# Patient Record
Sex: FEMALE | Race: WHITE
[De-identification: names, ages, dates, MRNs, and addresses within clinical notes are randomized per-mention and may not be internally consistent; named-entity substitution may affect disease eponyms.]

---

## 2017-03-01 ENCOUNTER — HOSPITAL ENCOUNTER (EMERGENCY)
Dept: HOSPITAL 47 - EC | Age: 57
Discharge: HOME | End: 2017-03-01
Payer: COMMERCIAL

## 2017-03-01 VITALS
TEMPERATURE: 97.8 F | RESPIRATION RATE: 16 BRPM | HEART RATE: 78 BPM | SYSTOLIC BLOOD PRESSURE: 153 MMHG | DIASTOLIC BLOOD PRESSURE: 78 MMHG

## 2017-03-01 DIAGNOSIS — Z88.2: ICD-10-CM

## 2017-03-01 DIAGNOSIS — Z79.899: ICD-10-CM

## 2017-03-01 DIAGNOSIS — R19.7: ICD-10-CM

## 2017-03-01 DIAGNOSIS — F17.200: ICD-10-CM

## 2017-03-01 DIAGNOSIS — J32.9: Primary | ICD-10-CM

## 2017-03-01 DIAGNOSIS — R10.9: ICD-10-CM

## 2017-03-01 LAB
ALP SERPL-CCNC: 91 U/L (ref 38–126)
ALT SERPL-CCNC: 22 U/L (ref 9–52)
ANION GAP SERPL CALC-SCNC: 13 MMOL/L
APTT BLD: 24.5 SEC (ref 22–30)
AST SERPL-CCNC: 21 U/L (ref 14–36)
BASOPHILS # BLD AUTO: 0.1 K/UL (ref 0–0.2)
BASOPHILS NFR BLD AUTO: 1 %
BUN SERPL-SCNC: 14 MG/DL (ref 7–17)
CALCIUM SPEC-MCNC: 10.2 MG/DL (ref 8.4–10.2)
CH: 31.7
CHCM: 33.4
CHLORIDE SERPL-SCNC: 104 MMOL/L (ref 98–107)
CK SERPL-CCNC: 55 U/L (ref 30–135)
CO2 SERPL-SCNC: 26 MMOL/L (ref 22–30)
EOSINOPHIL # BLD AUTO: 0.3 K/UL (ref 0–0.7)
EOSINOPHIL NFR BLD AUTO: 3 %
ERYTHROCYTE [DISTWIDTH] IN BLOOD BY AUTOMATED COUNT: 4.93 M/UL (ref 3.8–5.4)
ERYTHROCYTE [DISTWIDTH] IN BLOOD: 12.7 % (ref 11.5–15.5)
GLUCOSE SERPL-MCNC: 100 MG/DL (ref 74–99)
HCT VFR BLD AUTO: 47 % (ref 34–46)
HDW: 2.38
HGB BLD-MCNC: 15.5 GM/DL (ref 11.4–16)
INR PPP: 1.1 (ref ?–1.1)
LUC NFR BLD AUTO: 3 %
LYMPHOCYTES # SPEC AUTO: 2.2 K/UL (ref 1–4.8)
LYMPHOCYTES NFR SPEC AUTO: 28 %
MAGNESIUM SPEC-SCNC: 2.1 MG/DL (ref 1.6–2.3)
MCH RBC QN AUTO: 31.5 PG (ref 25–35)
MCHC RBC AUTO-ENTMCNC: 33.1 G/DL (ref 31–37)
MCV RBC AUTO: 95.3 FL (ref 80–100)
MONOCYTES # BLD AUTO: 0.4 K/UL (ref 0–1)
MONOCYTES NFR BLD AUTO: 4 %
NEUTROPHILS # BLD AUTO: 4.9 K/UL (ref 1.3–7.7)
NEUTROPHILS NFR BLD AUTO: 62 %
NON-AFRICAN AMERICAN GFR(MDRD): >60
PH UR: 6 [PH] (ref 5–8)
POTASSIUM SERPL-SCNC: 4.2 MMOL/L (ref 3.5–5.1)
PROT SERPL-MCNC: 8.5 G/DL (ref 6.3–8.2)
PT BLD: 10.9 SEC (ref 9–12)
SODIUM SERPL-SCNC: 143 MMOL/L (ref 137–145)
SP GR UR: 1 (ref 1–1.03)
TROPONIN I SERPL-MCNC: <0.012 NG/ML (ref 0–0.03)
UA BILLING (MACRO VS. MICRO): (no result)
UROBILINOGEN UR QL STRIP: <2 MG/DL (ref ?–2)
WBC # BLD AUTO: 0.2 10*3/UL
WBC # BLD AUTO: 8 K/UL (ref 3.8–10.6)
WBC (PEROX): 7.92

## 2017-03-01 PROCEDURE — 84484 ASSAY OF TROPONIN QUANT: CPT

## 2017-03-01 PROCEDURE — 82550 ASSAY OF CK (CPK): CPT

## 2017-03-01 PROCEDURE — 82553 CREATINE MB FRACTION: CPT

## 2017-03-01 PROCEDURE — 85610 PROTHROMBIN TIME: CPT

## 2017-03-01 PROCEDURE — 85730 THROMBOPLASTIN TIME PARTIAL: CPT

## 2017-03-01 PROCEDURE — 71020: CPT

## 2017-03-01 PROCEDURE — 93005 ELECTROCARDIOGRAM TRACING: CPT

## 2017-03-01 PROCEDURE — 99284 EMERGENCY DEPT VISIT MOD MDM: CPT

## 2017-03-01 PROCEDURE — 36415 COLL VENOUS BLD VENIPUNCTURE: CPT

## 2017-03-01 PROCEDURE — 81003 URINALYSIS AUTO W/O SCOPE: CPT

## 2017-03-01 PROCEDURE — 83735 ASSAY OF MAGNESIUM: CPT

## 2017-03-01 PROCEDURE — 80053 COMPREHEN METABOLIC PANEL: CPT

## 2017-03-01 PROCEDURE — 85025 COMPLETE CBC W/AUTO DIFF WBC: CPT

## 2017-03-01 PROCEDURE — 96374 THER/PROPH/DIAG INJ IV PUSH: CPT

## 2017-03-01 PROCEDURE — 96375 TX/PRO/DX INJ NEW DRUG ADDON: CPT

## 2017-03-01 NOTE — XR
EXAMINATION TYPE: XR chest 2V

 

DATE OF EXAM: 3/1/2017 4:07 PM

 

COMPARISON: 6/9/2012

 

HISTORY: Shortness of breath

 

TECHNIQUE:  Frontal and lateral views of the chest are obtained.

 

FINDINGS:

 

Scattered senescent parenchymal changes noted. Hyperinflation compatible with COPD. 

 

No evidence for infiltrate. No evidence for atelectasis.

 

Heart size is stable.

 

Mediastinal structures are stable and grossly unremarkable.

 

No evidence for hilar prominence.

 

Degenerative changes dorsal spine. 

 

IMPRESSION:

1. No evidence for acute pulmonary disease.
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
## 2017-03-24 ENCOUNTER — HOSPITAL ENCOUNTER (OUTPATIENT)
Dept: HOSPITAL 47 - EC | Age: 57
Setting detail: OBSERVATION
LOS: 2 days | Discharge: HOME | End: 2017-03-26
Payer: COMMERCIAL

## 2017-03-24 VITALS — BODY MASS INDEX: 21.5 KG/M2

## 2017-03-24 VITALS — RESPIRATION RATE: 18 BRPM

## 2017-03-24 DIAGNOSIS — R53.1: ICD-10-CM

## 2017-03-24 DIAGNOSIS — R35.0: ICD-10-CM

## 2017-03-24 DIAGNOSIS — Z88.2: ICD-10-CM

## 2017-03-24 DIAGNOSIS — K59.00: ICD-10-CM

## 2017-03-24 DIAGNOSIS — Z82.49: ICD-10-CM

## 2017-03-24 DIAGNOSIS — M19.90: ICD-10-CM

## 2017-03-24 DIAGNOSIS — F43.29: ICD-10-CM

## 2017-03-24 DIAGNOSIS — Z85.41: ICD-10-CM

## 2017-03-24 DIAGNOSIS — R09.81: ICD-10-CM

## 2017-03-24 DIAGNOSIS — R07.89: ICD-10-CM

## 2017-03-24 DIAGNOSIS — F41.0: ICD-10-CM

## 2017-03-24 DIAGNOSIS — R00.2: ICD-10-CM

## 2017-03-24 DIAGNOSIS — R63.4: ICD-10-CM

## 2017-03-24 DIAGNOSIS — I10: Primary | ICD-10-CM

## 2017-03-24 DIAGNOSIS — F17.200: ICD-10-CM

## 2017-03-24 DIAGNOSIS — R19.7: ICD-10-CM

## 2017-03-24 DIAGNOSIS — Z79.899: ICD-10-CM

## 2017-03-24 DIAGNOSIS — F32.9: ICD-10-CM

## 2017-03-24 LAB
ALP SERPL-CCNC: 80 U/L (ref 38–126)
ALT SERPL-CCNC: 21 U/L (ref 9–52)
ANION GAP SERPL CALC-SCNC: 10 MMOL/L
AST SERPL-CCNC: 19 U/L (ref 14–36)
BASOPHILS # BLD AUTO: 0 K/UL (ref 0–0.2)
BASOPHILS NFR BLD AUTO: 1 %
BUN SERPL-SCNC: 13 MG/DL (ref 7–17)
CALCIUM SPEC-MCNC: 9.9 MG/DL (ref 8.4–10.2)
CH: 31.3
CHCM: 33.1
CHLORIDE SERPL-SCNC: 105 MMOL/L (ref 98–107)
CO2 SERPL-SCNC: 27 MMOL/L (ref 22–30)
EOSINOPHIL # BLD AUTO: 0.2 K/UL (ref 0–0.7)
EOSINOPHIL NFR BLD AUTO: 3 %
ERYTHROCYTE [DISTWIDTH] IN BLOOD BY AUTOMATED COUNT: 5.08 M/UL (ref 3.8–5.4)
ERYTHROCYTE [DISTWIDTH] IN BLOOD: 12.2 % (ref 11.5–15.5)
GLUCOSE SERPL-MCNC: 88 MG/DL (ref 74–99)
HCT VFR BLD AUTO: 48.4 % (ref 34–46)
HDW: 2.28
HGB BLD-MCNC: 15.9 GM/DL (ref 11.4–16)
LUC NFR BLD AUTO: 3 %
LYMPHOCYTES # SPEC AUTO: 1.6 K/UL (ref 1–4.8)
LYMPHOCYTES NFR SPEC AUTO: 25 %
MCH RBC QN AUTO: 31.2 PG (ref 25–35)
MCHC RBC AUTO-ENTMCNC: 32.8 G/DL (ref 31–37)
MCV RBC AUTO: 95.2 FL (ref 80–100)
MONOCYTES # BLD AUTO: 0.4 K/UL (ref 0–1)
MONOCYTES NFR BLD AUTO: 6 %
NEUTROPHILS # BLD AUTO: 3.9 K/UL (ref 1.3–7.7)
NEUTROPHILS NFR BLD AUTO: 62 %
NON-AFRICAN AMERICAN GFR(MDRD): >60
PH UR: 6 [PH] (ref 5–8)
POTASSIUM SERPL-SCNC: 5.1 MMOL/L (ref 3.5–5.1)
PROT SERPL-MCNC: 7.4 G/DL (ref 6.3–8.2)
SODIUM SERPL-SCNC: 142 MMOL/L (ref 137–145)
SP GR UR: 1 (ref 1–1.03)
UA BILLING (MACRO VS. MICRO): (no result)
UROBILINOGEN UR QL STRIP: <2 MG/DL (ref ?–2)
WBC # BLD AUTO: 0.18 10*3/UL
WBC # BLD AUTO: 6.3 K/UL (ref 3.8–10.6)
WBC (PEROX): 6.11

## 2017-03-24 PROCEDURE — 81003 URINALYSIS AUTO W/O SCOPE: CPT

## 2017-03-24 PROCEDURE — 36415 COLL VENOUS BLD VENIPUNCTURE: CPT

## 2017-03-24 PROCEDURE — 93306 TTE W/DOPPLER COMPLETE: CPT

## 2017-03-24 PROCEDURE — 80306 DRUG TEST PRSMV INSTRMNT: CPT

## 2017-03-24 PROCEDURE — 96361 HYDRATE IV INFUSION ADD-ON: CPT

## 2017-03-24 PROCEDURE — 84484 ASSAY OF TROPONIN QUANT: CPT

## 2017-03-24 PROCEDURE — 99284 EMERGENCY DEPT VISIT MOD MDM: CPT

## 2017-03-24 PROCEDURE — 84443 ASSAY THYROID STIM HORMONE: CPT

## 2017-03-24 PROCEDURE — 83880 ASSAY OF NATRIURETIC PEPTIDE: CPT

## 2017-03-24 PROCEDURE — 87502 INFLUENZA DNA AMP PROBE: CPT

## 2017-03-24 PROCEDURE — 80061 LIPID PANEL: CPT

## 2017-03-24 PROCEDURE — 80053 COMPREHEN METABOLIC PANEL: CPT

## 2017-03-24 PROCEDURE — 83690 ASSAY OF LIPASE: CPT

## 2017-03-24 PROCEDURE — 80048 BASIC METABOLIC PNL TOTAL CA: CPT

## 2017-03-24 PROCEDURE — 93005 ELECTROCARDIOGRAM TRACING: CPT

## 2017-03-24 PROCEDURE — 96376 TX/PRO/DX INJ SAME DRUG ADON: CPT

## 2017-03-24 PROCEDURE — 85025 COMPLETE CBC W/AUTO DIFF WBC: CPT

## 2017-03-24 PROCEDURE — 71020: CPT

## 2017-03-24 PROCEDURE — 96372 THER/PROPH/DIAG INJ SC/IM: CPT

## 2017-03-24 PROCEDURE — 96374 THER/PROPH/DIAG INJ IV PUSH: CPT

## 2017-03-24 RX ADMIN — KETOROLAC TROMETHAMINE PRN MG: 30 INJECTION, SOLUTION INTRAMUSCULAR at 14:28

## 2017-03-24 RX ADMIN — METOPROLOL TARTRATE SCH MG: 25 TABLET, FILM COATED ORAL at 17:18

## 2017-03-24 RX ADMIN — HEPARIN SODIUM SCH UNIT: 5000 INJECTION, SOLUTION INTRAVENOUS; SUBCUTANEOUS at 22:03

## 2017-03-24 RX ADMIN — METOPROLOL TARTRATE SCH MG: 25 TABLET, FILM COATED ORAL at 23:41

## 2017-03-24 RX ADMIN — NICOTINE SCH: 14 PATCH, EXTENDED RELEASE TRANSDERMAL at 17:24

## 2017-03-24 RX ADMIN — KETOROLAC TROMETHAMINE PRN MG: 30 INJECTION, SOLUTION INTRAMUSCULAR at 23:40
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
## 2017-03-24 NOTE — XR
EXAMINATION TYPE: XR chest 2V

 

DATE OF EXAM: 3/24/2017 11:45 AM

 

COMPARISON: Chest x-ray March 1, 2017.

 

HISTORY: Hypertension with chest pain.

 

TECHNIQUE:  Frontal and lateral views of the chest are obtained.

 

FINDINGS:  There is no focal air space opacity, pleural effusion, or pneumothorax seen.  The cardiac 
silhouette size is within normal limits.   The osseous structures are intact.

 

IMPRESSION:  No acute process. No significant change from prior.

## 2017-03-24 NOTE — HP
DATE OF ADMISSION:  03/24/2017



CHIEF COMPLAINT: Palpitations, hypertension. 



HISTORY OF PRESENT ILLNESS: This 56-year-old woman with a past medical 

history of multiple medical problems, including hypertension, history 

of cervical cancer, history of DJD, anxiety, depression, being 

followed by Dr. Holloway in the outpatient setting, was not feeling well 

over the past several days. The patient is complaining of tiredness, 

weakness and palpitations. The patient felt chest fullness, also. The 

patient came to [facility] and was admitted for further 

evaluation. Patient was also found to have elevated blood pressure; 

systolic was 172/77. There is no history of any fever, rigor, or 

chills at this time.  



PAST MEDICAL HISTORY: 

1. History of hypertension. 

2. History of cervical cancer. 

3. History of DJD.

4. Anxiety. 

5. Depression. 



Medications prior to admission include:

1. Prozac 10 mg daily. 

2. Allegra 180 mg daily. 



ALLERGIES: 

1. BACTRIM. 

2. SULFA. 



FAMILY HISTORY: History of hypertension in father. History of cardiac 

stent in mother.  



SOCIAL HISTORY: Occasional alcohol intake. History of smoking. 



REVIEW OF SYSTEMS: 

ENT: No diminished vision. No diminished hearing. 

CARDIOVASCULAR: As mentioned earlier. 

RESPIRATORY: As mentioned earlier. 

GI: No nausea. 

: No dysuria. 

NERVOUS SYSTEM: No numbness, weakness. 

ALLERGY/IMMUNOLOGY: No asthma, hayfever. 

MUSCULOSKELETAL: As mentioned earlier. 

HEMATOLOGY/ONCOLOGY: No history of anemia. Otherwise as mentioned 

earlier. 

ENDOCRINE: No history of diabetes, hypothyroidism.

CONSTITUTIONAL:  As mentioned earlier. 

MUSCULOSKELETAL: Negative. 

ENDOCRINE: No diabetes mellitus. 

PSYCHIATRY: As mentioned earlier. 



PHYSICAL EXAMINATION: Patient alert and oriented x3. Pulse is 60, 

blood pressure 164/76, respiration 18, temperature 98.7, pulse ox 100% 

on room air.  

HEENT: Conjunctivae normal. Oral mucosa moist. 

NECK: No jugular venous distention. No carotid bruit. No lymph node 

enlargement. 

CARDIOVASCULAR SYSTEM: S1, S2 muffled. No S3. No S4.

RESPIRATORY SYSTEM: Breath sounds diminished at the bases. A few 

rhonchi. No crackles.  

ABDOMEN: Soft, non-tender. No mass palpable. 

LEGS: No edema. No swelling. 

NERVOUS SYSTEM: Higher functions as mentioned earlier. Moves all 4 

limbs. No focal motor or sensory deficit.  

LYMPHATICS:  No lymph node palpable in neck, axillae or groin.   

SKIN: No ulcer, rash, bleeding. 



LABS: CBC, BMP within normal limits. UA is negative. 



ASSESSMENT: 

1. Hypertension.

2. Palpitations and chest discomfort for evaluation. 

3. Anxiety, depression not otherwise specified. 

4. History of degenerative joint disease. 

5. History of hypertension. 

6. History of cervical cancer. 

7. History of nicotine dependence. 



RECOMMENDATIONS AND DISCUSSION: In this 56-year-old woman who 

presented with multiple complex medical issues, at this time I 

recommend to continue current medications, continue symptomatic 

treatment. I recommend beta blockers. Closely follow with Cardiology. 

Otherwise, guarded prognosis because of multiple complex medical 

issues. Further recommendations to follow. I would also recommend a 

psychiatric consult. I would also recommend the patient to follow up 

closely with Dr. Holloway in the outpatient setting. Medication 

reconciliation was done. Please see orders for further details.

## 2017-03-25 LAB
ANION GAP SERPL CALC-SCNC: 7 MMOL/L
BASOPHILS # BLD AUTO: 0.1 K/UL (ref 0–0.2)
BASOPHILS NFR BLD AUTO: 1 %
BUN SERPL-SCNC: 11 MG/DL (ref 7–17)
CALCIUM SPEC-MCNC: 9.3 MG/DL (ref 8.4–10.2)
CH: 31.2
CHCM: 32.9
CHLORIDE SERPL-SCNC: 108 MMOL/L (ref 98–107)
CHOLEST SERPL-MCNC: 157 MG/DL (ref ?–200)
CO2 SERPL-SCNC: 25 MMOL/L (ref 22–30)
EOSINOPHIL # BLD AUTO: 0.4 K/UL (ref 0–0.7)
EOSINOPHIL NFR BLD AUTO: 7 %
ERYTHROCYTE [DISTWIDTH] IN BLOOD BY AUTOMATED COUNT: 4.46 M/UL (ref 3.8–5.4)
ERYTHROCYTE [DISTWIDTH] IN BLOOD: 12.3 % (ref 11.5–15.5)
GLUCOSE SERPL-MCNC: 80 MG/DL (ref 74–99)
HCT VFR BLD AUTO: 42.5 % (ref 34–46)
HDLC SERPL-MCNC: 62 MG/DL (ref 40–60)
HDW: 2.3
HGB BLD-MCNC: 13.9 GM/DL (ref 11.4–16)
LUC NFR BLD AUTO: 4 %
LYMPHOCYTES # SPEC AUTO: 2.8 K/UL (ref 1–4.8)
LYMPHOCYTES NFR SPEC AUTO: 48 %
MCH RBC QN AUTO: 31.2 PG (ref 25–35)
MCHC RBC AUTO-ENTMCNC: 32.7 G/DL (ref 31–37)
MCV RBC AUTO: 95.2 FL (ref 80–100)
MONOCYTES # BLD AUTO: 0.3 K/UL (ref 0–1)
MONOCYTES NFR BLD AUTO: 5 %
NEUTROPHILS # BLD AUTO: 2 K/UL (ref 1.3–7.7)
NEUTROPHILS NFR BLD AUTO: 35 %
NON-AFRICAN AMERICAN GFR(MDRD): >60
POTASSIUM SERPL-SCNC: 4.9 MMOL/L (ref 3.5–5.1)
SODIUM SERPL-SCNC: 140 MMOL/L (ref 137–145)
TRIGL SERPL-MCNC: 79 MG/DL (ref ?–150)
WBC # BLD AUTO: 0.22 10*3/UL
WBC # BLD AUTO: 5.8 K/UL (ref 3.8–10.6)
WBC (PEROX): 5.83

## 2017-03-25 RX ADMIN — METOPROLOL TARTRATE SCH MG: 25 TABLET, FILM COATED ORAL at 21:48

## 2017-03-25 RX ADMIN — METOPROLOL TARTRATE SCH MG: 25 TABLET, FILM COATED ORAL at 08:27

## 2017-03-25 RX ADMIN — HEPARIN SODIUM SCH UNIT: 5000 INJECTION, SOLUTION INTRAVENOUS; SUBCUTANEOUS at 08:27

## 2017-03-25 RX ADMIN — KETOROLAC TROMETHAMINE PRN MG: 30 INJECTION, SOLUTION INTRAMUSCULAR at 22:54

## 2017-03-25 RX ADMIN — NICOTINE SCH: 14 PATCH, EXTENDED RELEASE TRANSDERMAL at 08:26

## 2017-03-25 RX ADMIN — HEPARIN SODIUM SCH UNIT: 5000 INJECTION, SOLUTION INTRAVENOUS; SUBCUTANEOUS at 21:48

## 2017-03-25 RX ADMIN — LORATADINE SCH MG: 10 TABLET ORAL at 08:27
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
## 2017-03-26 VITALS — DIASTOLIC BLOOD PRESSURE: 61 MMHG | TEMPERATURE: 97.1 F | SYSTOLIC BLOOD PRESSURE: 117 MMHG | HEART RATE: 55 BPM

## 2017-03-26 RX ADMIN — NICOTINE SCH: 14 PATCH, EXTENDED RELEASE TRANSDERMAL at 07:49

## 2017-03-26 RX ADMIN — HEPARIN SODIUM SCH UNIT: 5000 INJECTION, SOLUTION INTRAVENOUS; SUBCUTANEOUS at 07:50

## 2017-03-26 RX ADMIN — METOPROLOL TARTRATE SCH MG: 25 TABLET, FILM COATED ORAL at 07:50

## 2017-03-26 RX ADMIN — LORATADINE SCH MG: 10 TABLET ORAL at 07:50

## 2017-03-26 NOTE — DS
DATE OF ADMISSION:   03/24/2017

DATE OF DISCHARGE:   03/26/2017



FINAL DIAGNOSES:

1. Hypertension. 

2. Palpations chest, myocardial infarction ruled out, rule out 

coronary artery disease.  

3. Anxiety, depression, not otherwise specified. 

4. History of degenerative joint disease .

5. History of hypertension. 

6. History of cervical cancer.

7. History of nicotine dependence. 



DISCHARGE DISPOSITION: The patient will be discharged in a stable 

discharge with guarded prognosis.  Discharge cleared by multiple 

consultants.    



HISTORY OF PRESENT ILLNESS: This 56-year-old woman with a past medical 

history of multiple medical problems, admitted with hypertension, 

palpitations. The patient was treated symptomatically, myocardial 

infarction ruled out, cardiology recommended outpatient follow-up and 

Dr. Olivo from psych also saw the patient.   



On exam, vital signs stable.  CARDIOVASCULAR: S1, S2.  CENTRAL NERVOUS 

SYSTEM: No focal deficits. ABDOMEN: Soft.   



DISCHARGE MEDICATIONS AND DISCHARGE ADVICE: 

1. Diet is cardiac. 

2. Activity limited until follow-up. 

3. Follow up with Dr. Holloway in 2 to 3 days.

4. Follow-up with Dr. Ramos as advised in the outpatient setting. 

5. Medications Xanax 0.25 t.i.d. p.r.n. 

6. Tylenol 500 mg q.6 p.r.n. for pain. 

7. Lexapro 10 mg p.o. daily. 

8. Fexofenadine 180 mg p.o. daily. 

9. Lopressor 12.5 mg p.o. b.i.d. 

10. Habitrol 14 daily.



Once again, the patient will be discharged in stable condition with 

guarded prognosis.

## 2017-03-26 NOTE — PN
DATE OF SERVICE: 3/25/2017 



This 56-year-old woman who was admitted with palpitations and 

hypertension also had some significant depression also. Cardiology is 

following the patient closely.  Medication adjusted.  2D echo has been 

ordered. Patient is on low dose beta blockers. 



On exam, alert and oriented times three.  Pulse 61.  Blood pressure 

130/78, respiratory  rate 18, temperature 97.0, pulse ox 98% on room 

air. 

HEENT: Conjunctivae normal. 

NECK: No jugular venous distention.

CARDIOVASCULAR: S1, S2 muffled. 

RESPIRATORY: Breath sounds diminished at the bases. No rhonchi, no 

crackles.  

ABDOMEN: Soft. Nontender. 

LEGS: No edema. No swelling. 



Labs are CBC within normal limits, CMP with  62. 



ASSESSMENT:

1. Hypertension. 

2. Palpitations and chest discomfort for evaluation rule out coronary 

artery disease.  

3. Anxiety, depression, not otherwise specified. 

4. History of degenerative joint disease. 

5. Hypertension. 

6. History of cervical cancer. 

7. History of nicotine dependence.  



RECOMMENDATIONS AND DISCUSSION: At this time, recommend to continue 

current medications. Continue to monitor. Symptomatic treatment.  

Otherwise, closely follow up. Continue the beta blockers.  Closely 

follow with cardiology and psychiatric evaluation appreciated.  



DOROTHY

## 2017-03-26 NOTE — P.PN
Subjective


Principal diagnosis: 





Palpitations





This is a pleasant 56-year-old female patient with past medical history of pre-

eclampsia during her first pregnancy, anxiety and depression for which she was 

recently started on fluoxetine, cervical cancer and recent diagnosis of 

hypertension for which she has not been prescribed any antihypertensive agents.

  Was seen in the emergency department earlier this month and noted to be 

hypertensive.  He has not been on any medication for hypertension.  Presents 

for complaints of high blood pressure, palpitations, weakness, fatigue, change 

in bowel movements with alternating constipation and diarrhea.  Blood pressure 

upon presentation to the emergency room 160s to 190 systolic.  EKG on admission 

showed normal sinus rhythm.  Patient continues to be in normal sinus rhythm.  

Blood pressure today is stable.  TSH level is normal.  Arrangements are being 

made for possible discharge home, echocardiogram with Doppler study remains 

pending.  When the patient is discharged home we will recommend an event 

monitor as an outpatient.





Objective





- Vital Signs


Vital signs: 


 Vital Signs











Temp  97.1 F L  03/26/17 11:00


 


Pulse  55 L  03/26/17 11:00


 


Resp  18   03/26/17 11:00


 


BP  117/61   03/26/17 11:00


 


Pulse Ox  98   03/26/17 11:00








 Intake & Output











 03/25/17 03/26/17 03/26/17





 18:59 06:59 18:59


 


Intake Total 480 800 922


 


Balance 480 800 922


 


Weight 53.4 kg 51.5 kg 


 


Intake:   


 


  Oral 480 800 922


 


Other:   


 


  Voiding Method Toilet Toilet Toilet


 


  # Voids  1 














- Exam





PHYSICAL EXAMINATION: 





HEENT: Head is atraumatic, normocephalic.  Pupils equal, round.  Neck is 

supple.  There is no elevated jugular venous pressure.





HEART EXAMINATION: Heart S1, S2 normal.  No murmur or gallop heard.





CHEST EXAMINATION: Lungs are clear to auscultation and precussion. No chest 

wall tenderness is noted on palpation or with deep breathing.





ABDOMEN:  Soft, nontender. Bowel sounds are heard. No organomegaly noted.


 


EXTREMITIES: 2+ peripheral pulses with no evidence of peripheral edema and no 

calf tenderness noted.





NEUROLOGIC patient is awake, alert and oriented -3.


 


.


 








- Labs


CBC & Chem 7: 


 03/25/17 06:36





 03/25/17 06:34





Assessment and Plan


Plan: 





Assessment and Plan


Plan: 


Assessment and plan





#1 hypertension


#2 palpitations


#3 recent change in bowel movements with alternating constipation and diarrhea 

and 5 pound weight loss, PCP is scheduling patient for a colonoscopy


#4 anxiety and depression, recently started on fluoxetine


#5 history of cervical cancer





Plan


Cardiology's perspective, patient may be able to be discharged once cleared by 

the primary.  We will recommend an event monitor on discharge.  A follow-up 

appointment will also be made in the office post discharge.








DNP note has been reviewed, I agree with a documented findings and plan of 

care.  Patient was seen and examined.

## 2017-03-26 NOTE — P.PN
Progress Note - Text





Interval history: Patient was seen for psychiatric follow-up ,was laying in her 

bed .  She states that she is less anxious ,hoping to be discharged soon ,

discussed healthy coping skills to deal with ongoing stressor





Mental status exam: She is alert and cooperative with the interview.  Her 

affect is appropriate ,smiling at times   Her speech is spontaneous and 

coherent  Her mood is BETTER""She denies any thoughts of harm to self or 

others.  She denies any hallucinations.  There is no evidence of any active 

psychosis or italia,insight and judgment good





Plan: Continue current medication ,was referred to her therapist at [facility]  ,please discharge when medically cleared

## 2017-03-27 ENCOUNTER — HOSPITAL ENCOUNTER (OUTPATIENT)
Dept: HOSPITAL 47 - RADECHMAIN | Age: 57
Discharge: HOME | End: 2017-03-27
Payer: COMMERCIAL

## 2017-03-27 DIAGNOSIS — Z12.31: Primary | ICD-10-CM

## 2017-03-27 PROCEDURE — 93226 XTRNL ECG REC<48 HR SCAN A/R: CPT

## 2017-03-27 PROCEDURE — 93225 XTRNL ECG REC<48 HRS REC: CPT

## 2017-03-27 NOTE — ECHOF
Referral Reason:palpitations



MEASUREMENTS

--------

HEIGHT: 157.5 cm

WEIGHT: 53.1 kg

BP: 123/60

RVIDd:   2.8 cm     (< 3.3)

IVSd:   1.1 cm     (0.6 - 1.1)

LVIDd:   3.7 cm     (3.9 - 5.3)

LVPWd:   1.1 cm     (0.6 - 1.1)

IVSs:   1.5 cm

LVIDs:   2.6 cm

LVPWs:   1.4 cm

LA Diam:   2.6 cm     (2.7 - 3.8)

LAESV Index (A-L):   21.31 ml/m

Ao Diam:   2.8 cm     (2.0 - 3.7)

AV Cusp:   1.8 cm     (1.5 - 2.6)

MV EXCURSION:   14.946 mm     (> 18.000)

MV EF SLOPE:   120 mm/s     (70 - 150)

EPSS:   0.4 cm

MV E Lucio:   0.99 m/s

MV DecT:   175 ms

MV A Lucio:   0.76 m/s

MV E/A Ratio:   1.31 







FINDINGS

--------

Sinus rhythm.

This was a technically good study.

The left ventricular size is normal.   Left ventricular 

wall thickness is normal.   Overall left ventricular 

systolic function is normal with, an EF between 55 - 60 

%.

The right ventricle is normal in size.

Normal LA  size by volume 22+/-6 ml/m2.

The right atrium is normal in size.

The aortic valve is trileaflet and appears structurally 

normal.

The mitral valve is normal.   Mild mitral regurgitation 

is present.

The tricuspid valve appears structurally normal.   No 

regurgitation noted

There is no pulmonic regurgitation present.

The aortic root size is normal.

Normal inferior vena cava with normal inspiratory 

collapse consistent with estimated right atrial 

pressure of  5 mmHg.

There is no pericardial effusion.



CONCLUSIONS

--------

1. Sinus rhythm.

2. Normal inferior vena cava with normal inspiratory 

collapse consistent with estimated right atrial 

pressure of  5 mmHg.

3. There is no pericardial effusion.

4. This was a technically good study.

5. Left ventricular wall thickness is normal.

6. Normal LA size by volume 22+/-6 ml/m2.

7. The aortic valve is trileaflet and appears structurally 

normal.

8. Mild mitral regurgitation is present.

9. The tricuspid valve appears structurally normal.

10. There is no pulmonic regurgitation present.

11. The aortic root size is normal.





SONOGRAPHER: Gini Sanchez RDCS

## 2017-03-28 NOTE — MM
Reason for exam: screening  (asymptomatic).

Last mammogram was performed 2 years and 9 months ago.



History:

Patient is postmenopausal.



Physical Findings:

A clinical breast exam by your physician is recommended on an annual basis and 

results should be correlated with mammographic findings.



MG Screening Mammo w CAD

Bilateral CC and MLO view(s) were taken.

Prior study comparison: July 7, 2014, bilateral MG screening mammo w CAD.

The breast tissue is heterogeneously dense. This may lower the sensitivity of 

mammography.

Finding: There are typically benign calcifications in both breasts. There is a 

chronic nodularity in the right breast.





ASSESSMENT: Benign, BI-RAD 2



RECOMMENDATION:

Routine screening mammogram of both breasts in 1 year.

## 2018-10-13 ENCOUNTER — HOSPITAL ENCOUNTER (EMERGENCY)
Dept: HOSPITAL 47 - EC | Age: 58
LOS: 1 days | Discharge: HOME | End: 2018-10-14
Payer: COMMERCIAL

## 2018-10-13 VITALS — TEMPERATURE: 97.7 F

## 2018-10-13 DIAGNOSIS — I10: Primary | ICD-10-CM

## 2018-10-13 DIAGNOSIS — R09.81: ICD-10-CM

## 2018-10-13 DIAGNOSIS — Z79.899: ICD-10-CM

## 2018-10-13 DIAGNOSIS — F17.200: ICD-10-CM

## 2018-10-13 DIAGNOSIS — J34.89: ICD-10-CM

## 2018-10-13 DIAGNOSIS — Z91.048: ICD-10-CM

## 2018-10-13 DIAGNOSIS — Z88.2: ICD-10-CM

## 2018-10-13 DIAGNOSIS — Z82.49: ICD-10-CM

## 2018-10-13 PROCEDURE — 81003 URINALYSIS AUTO W/O SCOPE: CPT

## 2018-10-13 PROCEDURE — 93005 ELECTROCARDIOGRAM TRACING: CPT

## 2018-10-13 PROCEDURE — 84484 ASSAY OF TROPONIN QUANT: CPT

## 2018-10-13 PROCEDURE — 71046 X-RAY EXAM CHEST 2 VIEWS: CPT

## 2018-10-13 PROCEDURE — 36415 COLL VENOUS BLD VENIPUNCTURE: CPT

## 2018-10-13 PROCEDURE — 85025 COMPLETE CBC W/AUTO DIFF WBC: CPT

## 2018-10-13 PROCEDURE — 99283 EMERGENCY DEPT VISIT LOW MDM: CPT

## 2018-10-13 PROCEDURE — 80053 COMPREHEN METABOLIC PANEL: CPT

## 2018-10-13 NOTE — XR
EXAMINATION TYPE: XR chest 2V

 

DATE OF EXAM: 10/13/2018

 

COMPARISON: 3/24/2017

 

HISTORY: Hypertension and headache

 

TECHNIQUE:  Frontal and lateral views of the chest are obtained.

 

FINDINGS:  Heart and mediastinum are normal. Lungs are clear. Diaphragm is normal. Bony thorax appear
s normal.

 

IMPRESSION:  Normal chest. No change.
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
## 2018-10-14 VITALS — DIASTOLIC BLOOD PRESSURE: 82 MMHG | HEART RATE: 70 BPM | SYSTOLIC BLOOD PRESSURE: 160 MMHG

## 2018-10-14 VITALS — RESPIRATION RATE: 16 BRPM

## 2018-10-14 LAB
ALBUMIN SERPL-MCNC: 3.9 G/DL (ref 3.5–5)
ALP SERPL-CCNC: 74 U/L (ref 38–126)
ALT SERPL-CCNC: 21 U/L (ref 9–52)
ANION GAP SERPL CALC-SCNC: 10 MMOL/L
AST SERPL-CCNC: 19 U/L (ref 14–36)
BASOPHILS # BLD AUTO: 0.1 K/UL (ref 0–0.2)
BASOPHILS NFR BLD AUTO: 1 %
BUN SERPL-SCNC: 14 MG/DL (ref 7–17)
CALCIUM SPEC-MCNC: 9.4 MG/DL (ref 8.4–10.2)
CHLORIDE SERPL-SCNC: 108 MMOL/L (ref 98–107)
CO2 SERPL-SCNC: 23 MMOL/L (ref 22–30)
EOSINOPHIL # BLD AUTO: 0.4 K/UL (ref 0–0.7)
EOSINOPHIL NFR BLD AUTO: 6 %
ERYTHROCYTE [DISTWIDTH] IN BLOOD BY AUTOMATED COUNT: 4.85 M/UL (ref 3.8–5.4)
ERYTHROCYTE [DISTWIDTH] IN BLOOD: 13.2 % (ref 11.5–15.5)
GLUCOSE SERPL-MCNC: 88 MG/DL (ref 74–99)
HCT VFR BLD AUTO: 45.5 % (ref 34–46)
HGB BLD-MCNC: 14.9 GM/DL (ref 11.4–16)
LYMPHOCYTES # SPEC AUTO: 2.8 K/UL (ref 1–4.8)
LYMPHOCYTES NFR SPEC AUTO: 39 %
MCH RBC QN AUTO: 30.6 PG (ref 25–35)
MCHC RBC AUTO-ENTMCNC: 32.6 G/DL (ref 31–37)
MCV RBC AUTO: 93.8 FL (ref 80–100)
MONOCYTES # BLD AUTO: 0.4 K/UL (ref 0–1)
MONOCYTES NFR BLD AUTO: 6 %
NEUTROPHILS # BLD AUTO: 3.4 K/UL (ref 1.3–7.7)
NEUTROPHILS NFR BLD AUTO: 48 %
PH UR: 6 [PH] (ref 5–8)
PLATELET # BLD AUTO: 256 K/UL (ref 150–450)
POTASSIUM SERPL-SCNC: 3.7 MMOL/L (ref 3.5–5.1)
PROT SERPL-MCNC: 6.9 G/DL (ref 6.3–8.2)
SODIUM SERPL-SCNC: 141 MMOL/L (ref 137–145)
SP GR UR: 1 (ref 1–1.03)
UROBILINOGEN UR QL STRIP: <2 MG/DL (ref ?–2)
WBC # BLD AUTO: 7.2 K/UL (ref 3.8–10.6)

## 2019-04-01 ENCOUNTER — HOSPITAL ENCOUNTER (EMERGENCY)
Dept: HOSPITAL 47 - EC | Age: 59
Discharge: HOME | End: 2019-04-01
Payer: COMMERCIAL

## 2019-04-01 VITALS
SYSTOLIC BLOOD PRESSURE: 141 MMHG | HEART RATE: 81 BPM | DIASTOLIC BLOOD PRESSURE: 83 MMHG | TEMPERATURE: 98.1 F | RESPIRATION RATE: 20 BRPM

## 2019-04-01 DIAGNOSIS — R00.0: ICD-10-CM

## 2019-04-01 DIAGNOSIS — Z88.2: ICD-10-CM

## 2019-04-01 DIAGNOSIS — Z79.899: ICD-10-CM

## 2019-04-01 DIAGNOSIS — Z82.49: ICD-10-CM

## 2019-04-01 DIAGNOSIS — R00.2: Primary | ICD-10-CM

## 2019-04-01 DIAGNOSIS — F17.200: ICD-10-CM

## 2019-04-01 DIAGNOSIS — F41.9: ICD-10-CM

## 2019-04-01 DIAGNOSIS — I10: ICD-10-CM

## 2019-04-01 LAB
ALBUMIN SERPL-MCNC: 4.2 G/DL (ref 3.5–5)
ALP SERPL-CCNC: 78 U/L (ref 38–126)
ALT SERPL-CCNC: 20 U/L (ref 9–52)
ANION GAP SERPL CALC-SCNC: 11 MMOL/L
AST SERPL-CCNC: 21 U/L (ref 14–36)
BASOPHILS # BLD AUTO: 0.1 K/UL (ref 0–0.2)
BASOPHILS NFR BLD AUTO: 1 %
BUN SERPL-SCNC: 11 MG/DL (ref 7–17)
CALCIUM SPEC-MCNC: 9.7 MG/DL (ref 8.4–10.2)
CHLORIDE SERPL-SCNC: 108 MMOL/L (ref 98–107)
CO2 SERPL-SCNC: 24 MMOL/L (ref 22–30)
EOSINOPHIL # BLD AUTO: 0.4 K/UL (ref 0–0.7)
EOSINOPHIL NFR BLD AUTO: 5 %
ERYTHROCYTE [DISTWIDTH] IN BLOOD BY AUTOMATED COUNT: 4.35 M/UL (ref 3.8–5.4)
ERYTHROCYTE [DISTWIDTH] IN BLOOD: 13.3 % (ref 11.5–15.5)
GLUCOSE SERPL-MCNC: 106 MG/DL (ref 74–99)
HCT VFR BLD AUTO: 40.8 % (ref 34–46)
HGB BLD-MCNC: 13.7 GM/DL (ref 11.4–16)
LYMPHOCYTES # SPEC AUTO: 3.3 K/UL (ref 1–4.8)
LYMPHOCYTES NFR SPEC AUTO: 47 %
MAGNESIUM SPEC-SCNC: 2 MG/DL (ref 1.6–2.3)
MCH RBC QN AUTO: 31.5 PG (ref 25–35)
MCHC RBC AUTO-ENTMCNC: 33.6 G/DL (ref 31–37)
MCV RBC AUTO: 93.8 FL (ref 80–100)
MONOCYTES # BLD AUTO: 0.4 K/UL (ref 0–1)
MONOCYTES NFR BLD AUTO: 5 %
NEUTROPHILS # BLD AUTO: 2.8 K/UL (ref 1.3–7.7)
NEUTROPHILS NFR BLD AUTO: 39 %
PLATELET # BLD AUTO: 235 K/UL (ref 150–450)
POTASSIUM SERPL-SCNC: 3.6 MMOL/L (ref 3.5–5.1)
PROT SERPL-MCNC: 7.2 G/DL (ref 6.3–8.2)
SODIUM SERPL-SCNC: 143 MMOL/L (ref 137–145)
WBC # BLD AUTO: 7.1 K/UL (ref 3.8–10.6)

## 2019-04-01 PROCEDURE — 85025 COMPLETE CBC W/AUTO DIFF WBC: CPT

## 2019-04-01 PROCEDURE — 80053 COMPREHEN METABOLIC PANEL: CPT

## 2019-04-01 PROCEDURE — 84484 ASSAY OF TROPONIN QUANT: CPT

## 2019-04-01 PROCEDURE — 99285 EMERGENCY DEPT VISIT HI MDM: CPT

## 2019-04-01 PROCEDURE — 36415 COLL VENOUS BLD VENIPUNCTURE: CPT

## 2019-04-01 PROCEDURE — 83735 ASSAY OF MAGNESIUM: CPT

## 2019-04-01 PROCEDURE — 96360 HYDRATION IV INFUSION INIT: CPT

## 2019-04-01 PROCEDURE — 93005 ELECTROCARDIOGRAM TRACING: CPT

## 2019-04-01 PROCEDURE — 96361 HYDRATE IV INFUSION ADD-ON: CPT

## 2019-04-01 PROCEDURE — 84443 ASSAY THYROID STIM HORMONE: CPT

## 2019-04-01 PROCEDURE — 71045 X-RAY EXAM CHEST 1 VIEW: CPT

## 2019-04-01 NOTE — XR
EXAM:

  XR Chest, 1 View

 

CLINICAL HISTORY:

  ITS.REASON XR Reason: dysrhythmia

 

TECHNIQUE:

  Frontal view of the chest.

 

COMPARISON:

  Chest x-ray dated 11/6/2018.

 

FINDINGS:

  Lungs:  Unremarkable.  The lungs are clear.

  Pleural space:  Unremarkable.  No pneumothorax.

  Heart:  Unremarkable.  No cardiomegaly.

  Mediastinum:  Unremarkable.

  Bones/joints:  Reidentified healed posterior left rib fractures.

 

IMPRESSION:     

  No acute findings.
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
## 2020-12-31 ENCOUNTER — HOSPITAL ENCOUNTER (EMERGENCY)
Dept: HOSPITAL 47 - EC | Age: 60
Discharge: HOME | End: 2020-12-31
Payer: COMMERCIAL

## 2020-12-31 VITALS — SYSTOLIC BLOOD PRESSURE: 159 MMHG | DIASTOLIC BLOOD PRESSURE: 97 MMHG

## 2020-12-31 VITALS — RESPIRATION RATE: 18 BRPM | HEART RATE: 88 BPM | TEMPERATURE: 98.1 F

## 2020-12-31 DIAGNOSIS — Z23: ICD-10-CM

## 2020-12-31 DIAGNOSIS — Z88.2: ICD-10-CM

## 2020-12-31 DIAGNOSIS — I10: ICD-10-CM
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
## 2020-12-31 DIAGNOSIS — F17.200: ICD-10-CM

## 2020-12-31 DIAGNOSIS — Z88.1: ICD-10-CM

## 2020-12-31 DIAGNOSIS — Z79.899: ICD-10-CM

## 2020-12-31 DIAGNOSIS — T15.02XA: Primary | ICD-10-CM

## 2020-12-31 PROCEDURE — 90715 TDAP VACCINE 7 YRS/> IM: CPT

## 2020-12-31 PROCEDURE — 65220 REMOVE FOREIGN BODY FROM EYE: CPT

## 2020-12-31 PROCEDURE — 90471 IMMUNIZATION ADMIN: CPT

## 2020-12-31 PROCEDURE — 99283 EMERGENCY DEPT VISIT LOW MDM: CPT
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
## 2021-03-02 ENCOUNTER — HOSPITAL ENCOUNTER (OUTPATIENT)
Dept: HOSPITAL 47 - RADXRMAIN | Age: 61
End: 2021-03-02
Payer: COMMERCIAL

## 2021-03-02 DIAGNOSIS — M47.816: ICD-10-CM

## 2021-03-02 DIAGNOSIS — M85.88: ICD-10-CM

## 2021-03-02 DIAGNOSIS — M43.8X6: ICD-10-CM

## 2021-03-02 DIAGNOSIS — M51.36: Primary | ICD-10-CM

## 2021-03-02 PROCEDURE — 72100 X-RAY EXAM L-S SPINE 2/3 VWS: CPT

## 2021-03-02 NOTE — XR
Lumbar spine

 

HISTORY: Strain or muscle, back pain

 

3 views the lumbar spine

 

There is multilevel spondylosis. There is a spinal curvature which is S-shaped. Lumbar vertebral bodi
es show preserved height and bone mineralization is mildly reduced. Loss of disc height is present at
 L5-S1, L1-2. Sclerosis is present in the posterior elements. Atherosclerotic vascular calcifications
 are noted in the aorta.

 

IMPRESSION: Degenerative disc disease, facet arthropathy, spinal curvature, osteopenia.

## 2021-04-08 ENCOUNTER — HOSPITAL ENCOUNTER (OUTPATIENT)
Dept: HOSPITAL 47 - LABWHC1 | Age: 61
Discharge: HOME | End: 2021-04-08
Attending: INTERNAL MEDICINE
Payer: MEDICAID

## 2021-04-08 DIAGNOSIS — E78.5: Primary | ICD-10-CM

## 2021-04-08 LAB
CHOLEST SERPL-MCNC: 217 MG/DL (ref 0–200)
HDLC SERPL-MCNC: 56 MG/DL (ref 40–60)
LDLC SERPL CALC-MCNC: 134.6 MG/DL (ref 0–131)
TRIGL SERPL-MCNC: 132 MG/DL (ref 0–149)
VLDLC SERPL CALC-MCNC: 26.4 MG/DL (ref 5–40)

## 2021-04-08 PROCEDURE — 80061 LIPID PANEL: CPT

## 2021-04-08 PROCEDURE — 36415 COLL VENOUS BLD VENIPUNCTURE: CPT

## 2022-09-17 ENCOUNTER — HOSPITAL ENCOUNTER (EMERGENCY)
Dept: HOSPITAL 47 - EC | Age: 62
Discharge: HOME | End: 2022-09-17
Payer: MEDICAID

## 2022-09-17 VITALS — DIASTOLIC BLOOD PRESSURE: 89 MMHG | HEART RATE: 71 BPM | SYSTOLIC BLOOD PRESSURE: 159 MMHG | RESPIRATION RATE: 16 BRPM

## 2022-09-17 VITALS — TEMPERATURE: 98 F

## 2022-09-17 DIAGNOSIS — I10: ICD-10-CM

## 2022-09-17 DIAGNOSIS — F17.200: ICD-10-CM

## 2022-09-17 DIAGNOSIS — R10.31: Primary | ICD-10-CM

## 2022-09-17 DIAGNOSIS — R11.0: ICD-10-CM

## 2022-09-17 DIAGNOSIS — Z88.2: ICD-10-CM

## 2022-09-17 DIAGNOSIS — Z79.899: ICD-10-CM

## 2022-09-17 LAB
ALBUMIN SERPL-MCNC: 4.5 G/DL (ref 3.5–5)
ALP SERPL-CCNC: 91 U/L (ref 38–126)
ALT SERPL-CCNC: 18 U/L (ref 4–34)
AMYLASE SERPL-CCNC: 72 U/L (ref 30–110)
ANION GAP SERPL CALC-SCNC: 11 MMOL/L
APTT BLD: 23.7 SEC (ref 22–30)
AST SERPL-CCNC: 26 U/L (ref 14–36)
BASOPHILS # BLD AUTO: 0.1 K/UL (ref 0–0.2)
BASOPHILS NFR BLD AUTO: 1 %
BUN SERPL-SCNC: 12 MG/DL (ref 7–17)
CALCIUM SPEC-MCNC: 9.7 MG/DL (ref 8.4–10.2)
CHLORIDE SERPL-SCNC: 104 MMOL/L (ref 98–107)
CO2 SERPL-SCNC: 21 MMOL/L (ref 22–30)
EOSINOPHIL # BLD AUTO: 0.2 K/UL (ref 0–0.7)
EOSINOPHIL NFR BLD AUTO: 3 %
ERYTHROCYTE [DISTWIDTH] IN BLOOD BY AUTOMATED COUNT: 4.5 M/UL (ref 3.8–5.4)
ERYTHROCYTE [DISTWIDTH] IN BLOOD: 13 % (ref 11.5–15.5)
GLUCOSE SERPL-MCNC: 101 MG/DL (ref 74–99)
HCT VFR BLD AUTO: 43.1 % (ref 34–46)
HGB BLD-MCNC: 14.3 GM/DL (ref 11.4–16)
INR PPP: 1 (ref ?–1.2)
LIPASE SERPL-CCNC: 110 U/L (ref 23–300)
LYMPHOCYTES # SPEC AUTO: 2 K/UL (ref 1–4.8)
LYMPHOCYTES NFR SPEC AUTO: 27 %
MCH RBC QN AUTO: 31.8 PG (ref 25–35)
MCHC RBC AUTO-ENTMCNC: 33.2 G/DL (ref 31–37)
MCV RBC AUTO: 95.8 FL (ref 80–100)
MONOCYTES # BLD AUTO: 0.4 K/UL (ref 0–1)
MONOCYTES NFR BLD AUTO: 6 %
NEUTROPHILS # BLD AUTO: 4.5 K/UL (ref 1.3–7.7)
NEUTROPHILS NFR BLD AUTO: 62 %
PH UR: 5.5 [PH] (ref 5–8)
PLATELET # BLD AUTO: 295 K/UL (ref 150–450)
POTASSIUM SERPL-SCNC: 4.3 MMOL/L (ref 3.5–5.1)
PROT SERPL-MCNC: 7.3 G/DL (ref 6.3–8.2)
PT BLD: 10.7 SEC (ref 9–12)
SODIUM SERPL-SCNC: 136 MMOL/L (ref 137–145)
SP GR UR: 1 (ref 1–1.03)
UROBILINOGEN UR QL STRIP: <2 MG/DL (ref ?–2)
WBC # BLD AUTO: 7.3 K/UL (ref 3.8–10.6)

## 2022-09-17 PROCEDURE — 85610 PROTHROMBIN TIME: CPT

## 2022-09-17 PROCEDURE — 36415 COLL VENOUS BLD VENIPUNCTURE: CPT

## 2022-09-17 PROCEDURE — 83690 ASSAY OF LIPASE: CPT

## 2022-09-17 PROCEDURE — 80053 COMPREHEN METABOLIC PANEL: CPT

## 2022-09-17 PROCEDURE — 96375 TX/PRO/DX INJ NEW DRUG ADDON: CPT

## 2022-09-17 PROCEDURE — 85730 THROMBOPLASTIN TIME PARTIAL: CPT

## 2022-09-17 PROCEDURE — 99284 EMERGENCY DEPT VISIT MOD MDM: CPT

## 2022-09-17 PROCEDURE — 96374 THER/PROPH/DIAG INJ IV PUSH: CPT

## 2022-09-17 PROCEDURE — 85025 COMPLETE CBC W/AUTO DIFF WBC: CPT

## 2022-09-17 PROCEDURE — 96372 THER/PROPH/DIAG INJ SC/IM: CPT

## 2022-09-17 PROCEDURE — 74177 CT ABD & PELVIS W/CONTRAST: CPT

## 2022-09-17 PROCEDURE — 96361 HYDRATE IV INFUSION ADD-ON: CPT

## 2022-09-17 PROCEDURE — 74018 RADEX ABDOMEN 1 VIEW: CPT

## 2022-09-17 PROCEDURE — 81003 URINALYSIS AUTO W/O SCOPE: CPT

## 2022-09-17 PROCEDURE — 82150 ASSAY OF AMYLASE: CPT

## 2022-09-17 NOTE — XR
EXAMINATION TYPE: XR KUB

 

DATE OF EXAM: 9/17/2022

 

COMPARISON: NONE

 

HISTORY: Pain

 

TECHNIQUE: 2 views upright

 

FINDINGS: There is no sign of intestinal obstruction or pneumoperitoneum. Fecal pattern is normal. No
 sign of a mass. No pathologic calcification.

 

IMPRESSION: Nonacute abdomen.
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
## 2022-09-17 NOTE — CT
EXAMINATION TYPE: CT abdomen pelvis w con

 

DATE OF EXAM: 9/17/2022

 

COMPARISON: None

 

HISTORY: RLQ pain

 

CT DLP: 651.5 mGycm

Automated exposure control for dose reduction was used.

 

CONTRAST: 

Performed with IV Contrast, patient injected with 100 mL of Isovue 300.

 

 

Images obtained from the diaphragm to the floor the pelvis with IV contrast.

 

Lung bases are clear. No pleural effusion. Heart size is normal. No pericardial effusion. Liver splee
n and stomach pancreas appear intact. The bile duct are not dilated. Gallbladder appears normal.

 

There is no adrenal mass. Kidneys show satisfactory contrast opacification. There is no hydronephrosi
s. Ureters are not dilated. No retroperitoneal adenopathy. The bladder distends smoothly. No inguinal
 hernia. No free fluid in the pelvis. Uterus is intact. No pelvic mass.

 

Lumbar vertebra have normal alignment. There is vacuum disc at L5-S1. No compression fracture. The rosetta
ny pelvis is intact. The hip joints are intact.

 

There is no mesenteric edema. No ascites or free air. No sign of a bowel obstruction. Appendix is not
 seen.

 

IMPRESSION:

Negative CT scan abdomen and pelvis. Appendix not clearly seen. No sign of thickened appendix.

## 2023-03-22 ENCOUNTER — HOSPITAL ENCOUNTER (EMERGENCY)
Dept: HOSPITAL 47 - EC | Age: 63
Discharge: HOME | End: 2023-03-22
Payer: COMMERCIAL

## 2023-03-22 VITALS — TEMPERATURE: 97.5 F | RESPIRATION RATE: 18 BRPM

## 2023-03-22 VITALS — DIASTOLIC BLOOD PRESSURE: 84 MMHG | SYSTOLIC BLOOD PRESSURE: 158 MMHG | HEART RATE: 67 BPM

## 2023-03-22 DIAGNOSIS — Z88.1: ICD-10-CM

## 2023-03-22 DIAGNOSIS — Z79.899: ICD-10-CM

## 2023-03-22 DIAGNOSIS — I10: ICD-10-CM

## 2023-03-22 DIAGNOSIS — F17.200: ICD-10-CM

## 2023-03-22 DIAGNOSIS — Z88.2: ICD-10-CM

## 2023-03-22 DIAGNOSIS — J32.9: Primary | ICD-10-CM

## 2023-03-22 LAB
ANION GAP SERPL CALC-SCNC: 8 MMOL/L
BASOPHILS # BLD AUTO: 0.1 K/UL (ref 0–0.2)
BASOPHILS NFR BLD AUTO: 1 %
BUN SERPL-SCNC: 16 MG/DL (ref 7–17)
CALCIUM SPEC-MCNC: 9.6 MG/DL (ref 8.4–10.2)
CHLORIDE SERPL-SCNC: 107 MMOL/L (ref 98–107)
CO2 SERPL-SCNC: 24 MMOL/L (ref 22–30)
EOSINOPHIL # BLD AUTO: 0.2 K/UL (ref 0–0.7)
EOSINOPHIL NFR BLD AUTO: 2 %
ERYTHROCYTE [DISTWIDTH] IN BLOOD BY AUTOMATED COUNT: 4.62 M/UL (ref 3.8–5.4)
ERYTHROCYTE [DISTWIDTH] IN BLOOD: 12.6 % (ref 11.5–15.5)
GLUCOSE SERPL-MCNC: 99 MG/DL (ref 74–99)
HCT VFR BLD AUTO: 42.5 % (ref 34–46)
HGB BLD-MCNC: 14.9 GM/DL (ref 11.4–16)
LYMPHOCYTES # SPEC AUTO: 1.6 K/UL (ref 1–4.8)
LYMPHOCYTES NFR SPEC AUTO: 18 %
MCH RBC QN AUTO: 32.2 PG (ref 25–35)
MCHC RBC AUTO-ENTMCNC: 35.1 G/DL (ref 31–37)
MCV RBC AUTO: 92 FL (ref 80–100)
MONOCYTES # BLD AUTO: 0.4 K/UL (ref 0–1)
MONOCYTES NFR BLD AUTO: 4 %
NEUTROPHILS # BLD AUTO: 6.4 K/UL (ref 1.3–7.7)
NEUTROPHILS NFR BLD AUTO: 73 %
PLATELET # BLD AUTO: 262 K/UL (ref 150–450)
POTASSIUM SERPL-SCNC: 4.5 MMOL/L (ref 3.5–5.1)
SODIUM SERPL-SCNC: 139 MMOL/L (ref 137–145)
WBC # BLD AUTO: 8.8 K/UL (ref 3.8–10.6)

## 2023-03-22 PROCEDURE — 87636 SARSCOV2 & INF A&B AMP PRB: CPT

## 2023-03-22 PROCEDURE — 36415 COLL VENOUS BLD VENIPUNCTURE: CPT

## 2023-03-22 PROCEDURE — 80048 BASIC METABOLIC PNL TOTAL CA: CPT

## 2023-03-22 PROCEDURE — 85025 COMPLETE CBC W/AUTO DIFF WBC: CPT

## 2023-03-22 PROCEDURE — 99285 EMERGENCY DEPT VISIT HI MDM: CPT
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
## 2023-05-05 ENCOUNTER — HOSPITAL ENCOUNTER (OUTPATIENT)
Dept: HOSPITAL 47 - EC | Age: 63
Setting detail: OBSERVATION
LOS: 1 days | Discharge: HOME | End: 2023-05-06
Attending: INTERNAL MEDICINE | Admitting: INTERNAL MEDICINE
Payer: COMMERCIAL

## 2023-05-05 DIAGNOSIS — E78.5: ICD-10-CM

## 2023-05-05 DIAGNOSIS — E87.1: ICD-10-CM

## 2023-05-05 DIAGNOSIS — Z87.440: ICD-10-CM

## 2023-05-05 DIAGNOSIS — Z82.49: ICD-10-CM

## 2023-05-05 DIAGNOSIS — F17.200: ICD-10-CM

## 2023-05-05 DIAGNOSIS — R07.9: Primary | ICD-10-CM

## 2023-05-05 DIAGNOSIS — I10: ICD-10-CM

## 2023-05-05 DIAGNOSIS — F41.9: ICD-10-CM

## 2023-05-05 DIAGNOSIS — Z79.899: ICD-10-CM

## 2023-05-05 DIAGNOSIS — Z88.2: ICD-10-CM

## 2023-05-05 LAB
ALBUMIN SERPL-MCNC: 4.4 G/DL (ref 3.5–5)
ALP SERPL-CCNC: 69 U/L (ref 38–126)
ALT SERPL-CCNC: 19 U/L (ref 4–34)
ANION GAP SERPL CALC-SCNC: 11 MMOL/L
APTT BLD: 23.9 SEC (ref 22–30)
AST SERPL-CCNC: 24 U/L (ref 14–36)
BASOPHILS # BLD AUTO: 0 K/UL (ref 0–0.2)
BASOPHILS NFR BLD AUTO: 1 %
BUN SERPL-SCNC: 19 MG/DL (ref 7–17)
CALCIUM SPEC-MCNC: 9.1 MG/DL (ref 8.4–10.2)
CHLORIDE SERPL-SCNC: 102 MMOL/L (ref 98–107)
CO2 SERPL-SCNC: 22 MMOL/L (ref 22–30)
EOSINOPHIL # BLD AUTO: 0.2 K/UL (ref 0–0.7)
EOSINOPHIL NFR BLD AUTO: 3 %
ERYTHROCYTE [DISTWIDTH] IN BLOOD BY AUTOMATED COUNT: 4.58 M/UL (ref 3.8–5.4)
ERYTHROCYTE [DISTWIDTH] IN BLOOD: 12.6 % (ref 11.5–15.5)
GLUCOSE SERPL-MCNC: 107 MG/DL (ref 74–99)
HCT VFR BLD AUTO: 43.3 % (ref 34–46)
HGB BLD-MCNC: 14.1 GM/DL (ref 11.4–16)
INR PPP: 1 (ref ?–1.2)
LYMPHOCYTES # SPEC AUTO: 1.4 K/UL (ref 1–4.8)
LYMPHOCYTES NFR SPEC AUTO: 25 %
MAGNESIUM SPEC-SCNC: 1.9 MG/DL (ref 1.6–2.3)
MCH RBC QN AUTO: 30.8 PG (ref 25–35)
MCHC RBC AUTO-ENTMCNC: 32.6 G/DL (ref 31–37)
MCV RBC AUTO: 94.5 FL (ref 80–100)
MONOCYTES # BLD AUTO: 0.2 K/UL (ref 0–1)
MONOCYTES NFR BLD AUTO: 4 %
NEUTROPHILS # BLD AUTO: 3.7 K/UL (ref 1.3–7.7)
NEUTROPHILS NFR BLD AUTO: 66 %
PLATELET # BLD AUTO: 271 K/UL (ref 150–450)
POTASSIUM SERPL-SCNC: 4.3 MMOL/L (ref 3.5–5.1)
PROT SERPL-MCNC: 7.3 G/DL (ref 6.3–8.2)
PT BLD: 10.8 SEC (ref 9–12)
SODIUM SERPL-SCNC: 135 MMOL/L (ref 137–145)
WBC # BLD AUTO: 5.6 K/UL (ref 3.8–10.6)

## 2023-05-05 PROCEDURE — 96374 THER/PROPH/DIAG INJ IV PUSH: CPT

## 2023-05-05 PROCEDURE — 99285 EMERGENCY DEPT VISIT HI MDM: CPT

## 2023-05-05 PROCEDURE — 94760 N-INVAS EAR/PLS OXIMETRY 1: CPT

## 2023-05-05 PROCEDURE — 84484 ASSAY OF TROPONIN QUANT: CPT

## 2023-05-05 PROCEDURE — 85025 COMPLETE CBC W/AUTO DIFF WBC: CPT

## 2023-05-05 PROCEDURE — 93005 ELECTROCARDIOGRAM TRACING: CPT

## 2023-05-05 PROCEDURE — 83735 ASSAY OF MAGNESIUM: CPT

## 2023-05-05 PROCEDURE — 80053 COMPREHEN METABOLIC PANEL: CPT

## 2023-05-05 PROCEDURE — 85610 PROTHROMBIN TIME: CPT

## 2023-05-05 PROCEDURE — 36415 COLL VENOUS BLD VENIPUNCTURE: CPT

## 2023-05-05 PROCEDURE — 93306 TTE W/DOPPLER COMPLETE: CPT

## 2023-05-05 PROCEDURE — 85379 FIBRIN DEGRADATION QUANT: CPT

## 2023-05-05 PROCEDURE — 71046 X-RAY EXAM CHEST 2 VIEWS: CPT

## 2023-05-05 PROCEDURE — 80061 LIPID PANEL: CPT

## 2023-05-05 PROCEDURE — 85730 THROMBOPLASTIN TIME PARTIAL: CPT

## 2023-05-05 RX ADMIN — NITROGLYCERIN SCH INCH: 20 OINTMENT TOPICAL at 18:48
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
## 2023-05-05 NOTE — XR
EXAMINATION TYPE: XR chest 2V

 

DATE OF EXAM: 5/5/2023 11:43 AM

 

COMPARISON: Chest radiographs from 11/6/2018

 

TECHNIQUE: XR chest 2V Frontal and lateral views of the chest.

 

CLINICAL INDICATION:Female, 62 years old with history of Chest Pain; 

 

FINDINGS: 

Lungs/Pleura: There is no evidence of pleural effusion, focal consolidation, or pneumothorax.  

Pulmonary vascularity: Unremarkable.

Heart/mediastinum: Cardiomediastinal silhouette is unremarkable.

Musculoskeletal: No acute osseous pathology. Mild degenerative changes of the thoracic spine. Old hea
led left seventh rib fracture.

 

IMPRESSION: 

No acute cardiopulmonary disease/process.

## 2023-05-06 VITALS
DIASTOLIC BLOOD PRESSURE: 69 MMHG | RESPIRATION RATE: 18 BRPM | HEART RATE: 62 BPM | SYSTOLIC BLOOD PRESSURE: 116 MMHG | TEMPERATURE: 98 F

## 2023-05-06 LAB
CHOLEST SERPL-MCNC: 217 MG/DL (ref 0–200)
HDLC SERPL-MCNC: 48.2 MG/DL (ref 40–60)
LDLC SERPL CALC-MCNC: 144 MG/DL (ref 0–131)
TRIGL SERPL-MCNC: 124 MG/DL (ref 0–149)
VLDLC SERPL CALC-MCNC: 24.8 MG/DL (ref 5–40)

## 2023-05-06 RX ADMIN — NITROGLYCERIN SCH: 20 OINTMENT TOPICAL at 00:20

## 2023-05-06 RX ADMIN — NICOTINE SCH: 14 PATCH, EXTENDED RELEASE TRANSDERMAL at 09:03

## 2023-05-06 RX ADMIN — NITROGLYCERIN SCH: 20 OINTMENT TOPICAL at 06:04

## 2023-05-06 RX ADMIN — NICOTINE SCH: 14 PATCH, EXTENDED RELEASE TRANSDERMAL at 09:02

## 2023-05-06 NOTE — DS
DISCHARGE SUMMARY



FINAL DIAGNOSES:

1. Chest pain, myocardial infarction ruled out  versus musculoskeletal pain.

2. Hypertension.

3. Mild hyponatremia.

4. Anxiety.

5. History of nicotine dependence.



DISPOSITION:

 the patient will be discharged after Cardiology clearance.



HISTORY OF PRESENT ILLNESS:

This 62-year-old woman was admitted with left-sided chest pain radiating to the 
front,

myocardial infarction ruled out.  Cardiology saw the patient and recommended 2D 
echo

and outpatient followup.



PHYSICAL EXAMINATION:

VITAL SIGNS:  Stable.

CARDIOVASCULAR:  S1, S2.

ABDOMEN:  Soft.



The patient was discharged in stable condition.  Guarded prognosis.  Continue 
the home

medications.  Add aspirin.  Outpatient followup with Cardiology and follow up 
with

primary physician, which I advised the patient  followup and outpatient stress

test per Cardiology.





MMODL / IJN: [id number] / Job#: [id number]

[id number]
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
## 2023-05-06 NOTE — HP
HISTORY AND PHYSICAL



CHIEF COMPLAINT:

Chest pain.



HISTORY OF PRESENT ILLNESS:

This is a 62-year-old woman with a past medical history of multiple medical 
problems

including hypertension, was complaining of chest pain.  The patient woke up and 
had

some sharp chest pain in the lateral part of the chest on the left side which 
was

radiating to the axilla, but the patient became more constant, came to the front
of the

chest.  The patient came to [facility].  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 
yard work

previously recently.  There is no history of any fever, rigors, or chills.  
Initial

troponins are negative.  The EKG which I reviewed personally showed no acute 
changes.

The patient was admitted for further evaluation and treatment.



PAST MEDICAL HISTORY:

History of hypertension, rest of the history and chart is also reviewed.



HOME MEDICATIONS:

Reviewed include metoprolol, dose and rest of medications noted.



ALLERGIES:

Bactrim.



FAMILY HISTORY:

Hypertension and stents.



SOCIAL HISTORY:

Smoking and occasional alcohol intake.



REVIEW OF SYSTEMS:

A 14-point review is negative as mentioned.



PHYSICAL EXAMINATION:

VITAL SIGNS:  Pulse 61, blood pressure ntd and respirations 16.

HEENT:  Conjunctivae normal.

NECK:  No jugular venous distention.

RESPIRATIONS:  Diminished at the bases.

ABDOMEN:  Soft, nontender.

LEGS:  No edema, no swelling.

NERVOUS SYSTEM:  No focal deficits.

JOINTS:  No active deforming arthropathy.



LABORATORY DATA:

Noted.



ASSESSMENT:

1. Chest pain, possible unstable angina.

2. Hypertension.

3. Mild hyponatremia.

4. Anxiety.

5. History of nicotine dependence.



RECOMMENDATIONS AND DISCUSSION:

This 62-year-old woman presented with multiple medical issues, we will monitor 
the

patient closely.  We will rule out myocardial infarction.  Follow closely with

Cardiology and repeat labs, possible stress test.  Prognosis guarded.  Further

recommendations to follow.





MMODL / IJN: [id number] / Job#: [id number]

MTDD

## 2023-05-06 NOTE — CA
Transthoracic Echo Report 

 Name: Barb Corona 

 MRN:    [id number] 

 Age:    62     Gender:     F 

 

 :    1960 

 Exam Date:     2023 12:11 

 Exam Location: [facility] 

 Ht (in):     62     Wt (lb):     132 

 Ordering Physician:        Alice Luna 

 Attending/Referring Phys:         [id number], Jeremy 

 Technician         Gini Sanchez, EN 

 Procedure CPT: 

 Indications:       chestn pain 

 

 Cardiac Hx: 

 Technical Quality:      Excellent 

 Contrast 1:                                Total Dose (mL): 

 Contrast 2:                                Total Dose (mL): 

 

 MEASUREMENTS  (Male / Female) Normal Values 

 2D ECHO 

 LV Diastolic Diameter PLAX        4.3 cm                4.2 - 5.9 / 3.9 - 5.3 cm 

 LV Systolic Diameter PLAX         2.7 cm                 

 IVS Diastolic Thickness           0.9 cm                0.6 - 1.0 / 0.6 - 0.9 cm 

 LVPW Diastolic Thickness          0.9 cm                0.6 - 1.0 / 0.6 - 0.9 cm 

 LV Relative Wall Thickness        0.4                    

 RV Internal Dim ED PLAX           3.2 cm                 

 LA Systolic Diameter LX           3.2 cm                3.0 - 4.0 / 2.7 - 3.8 cm 

 LA Volume                         33.9 cm???              18 - 58 / 22 - 52 cm??? 

 

 M-MODE 

 Aortic Root Diameter MM           2.7 cm                 

 MV E Point Septal Separation      0.4 cm                 

 AV Cusp Separation MM             2.0 cm                 

 

 DOPPLER 

 AV Peak Velocity                  124.4 cm/s             

 AV Peak Gradient                  6.2 mmHg               

 MV Area PHT                       3.1 cm???                

 Mitral E Point Velocity           86.3 cm/s              

 Mitral A Point Velocity           81.2 cm/s              

 Mitral E to A Ratio               1.1                    

 MV Deceleration Time              246.1 ms               

 TR Peak Velocity                  203.9 cm/s             

 TR Peak Gradient                  16.6 mmHg              

 Right Ventricular Systolic Press  21.6 mmHg              

 

 

 FINDINGS 

 Left Ventricle 

 Left ventricular ejection fraction is estimated at 55-60%. Left ventricular  

 cavity size normal. Left ventricular wall thickness normal. Normal left  

 ventricular wall motion. 

 

 Right Ventricle 

 Normal right ventricular size and function. Right ventricular systolic pressure  

 within normal limits. 

 

 Right Atrium 

 Normal right atrial size. 

 

 Left Atrium 

 Normal left atrial size. 

 

 Mitral Valve 

 Structurally normal mitral valve. Mild mitral regurgitation. 

 

 Aortic Valve 

 Trileaflet aortic valve. No aortic valve stenosis or regurgitation. 

 

 Tricuspid Valve 

 Structurally normal tricuspid valve. Mild tricuspid regurgitation. 

 

 Pulmonic Valve 

 Structurally normal pulmonic valve. Trace pulmonic regurgitation. 

 

 Pericardium 

 Normal pericardium. No pericardial effusion. 

 

 Aorta 

 Normal size aortic root and proximal ascending aorta. 

 

 CONCLUSIONS 

 1.  Normal ventricle size and systolic function X 

 2.  Mild mitral and tricuspid regurgitation 

 Previewed by:  

 Dr. Theron Cheng MD 

 (Electronically Signed) 

 Final Date:      06 May 2023 13:30

## 2023-06-16 ENCOUNTER — HOSPITAL ENCOUNTER (OUTPATIENT)
Dept: HOSPITAL 47 - LABPAT | Age: 63
Discharge: HOME | End: 2023-06-16
Attending: INTERNAL MEDICINE
Payer: SELF-PAY

## 2023-06-16 DIAGNOSIS — R06.02: ICD-10-CM

## 2023-06-16 DIAGNOSIS — Z01.812: Primary | ICD-10-CM

## 2023-06-16 LAB
ANION GAP SERPL CALC-SCNC: 10 MMOL/L (ref 4–12)
BUN SERPL-SCNC: 14.7 MG/DL (ref 9–27)
CHLORIDE SERPL-SCNC: 105 MMOL/L (ref 96–109)
CO2 SERPL-SCNC: 26 MMOL/L (ref 21.6–31.8)
ERYTHROCYTE [DISTWIDTH] IN BLOOD BY AUTOMATED COUNT: 4.43 X 10*6/UL (ref 4.1–5.2)
ERYTHROCYTE [DISTWIDTH] IN BLOOD: 12.9 % (ref 11.5–14.5)
HCT VFR BLD AUTO: 43.4 % (ref 37.2–46.3)
HGB BLD-MCNC: 13.8 D/DL (ref 12–15)
MCH RBC QN AUTO: 31.2 PG (ref 27–32)
MCHC RBC AUTO-ENTMCNC: 31.8 D/DL (ref 32–37)
MCV RBC AUTO: 98 FL (ref 80–97)
NRBC BLD AUTO-RTO: 0 X 10*3/UL (ref 0–0.01)
PLATELET # BLD AUTO: 312 X 10*3/UL (ref 140–440)
POTASSIUM SERPL-SCNC: 5.1 MMOL/L (ref 3.5–5.5)
SODIUM SERPL-SCNC: 141 MMOL/L (ref 135–145)
WBC # BLD AUTO: 7.75 X 10*3/UL (ref 4.5–10)

## 2023-06-16 PROCEDURE — 84520 ASSAY OF UREA NITROGEN: CPT

## 2023-06-16 PROCEDURE — 80051 ELECTROLYTE PANEL: CPT

## 2023-06-16 PROCEDURE — 85027 COMPLETE CBC AUTOMATED: CPT

## 2023-06-16 PROCEDURE — 82565 ASSAY OF CREATININE: CPT

## 2023-07-03 ENCOUNTER — HOSPITAL ENCOUNTER (OUTPATIENT)
Dept: HOSPITAL 47 - CATHCVL | Age: 63
Discharge: HOME | End: 2023-07-03
Attending: INTERNAL MEDICINE
Payer: MEDICAID

## 2023-07-03 VITALS — SYSTOLIC BLOOD PRESSURE: 132 MMHG | DIASTOLIC BLOOD PRESSURE: 66 MMHG | HEART RATE: 68 BPM

## 2023-07-03 VITALS — TEMPERATURE: 98.1 F | RESPIRATION RATE: 16 BRPM

## 2023-07-03 DIAGNOSIS — E78.2: ICD-10-CM

## 2023-07-03 DIAGNOSIS — Z79.899: ICD-10-CM

## 2023-07-03 DIAGNOSIS — I10: ICD-10-CM

## 2023-07-03 DIAGNOSIS — I25.10: Primary | ICD-10-CM

## 2023-07-03 DIAGNOSIS — Z88.2: ICD-10-CM

## 2023-07-03 DIAGNOSIS — F17.210: ICD-10-CM

## 2023-07-03 DIAGNOSIS — Z82.49: ICD-10-CM

## 2023-07-03 DIAGNOSIS — Z79.82: ICD-10-CM

## 2023-07-03 DIAGNOSIS — I49.3: ICD-10-CM

## 2023-07-03 PROCEDURE — 93458 L HRT ARTERY/VENTRICLE ANGIO: CPT

## 2023-07-03 PROCEDURE — 76937 US GUIDE VASCULAR ACCESS: CPT

## 2023-07-03 PROCEDURE — 93799 UNLISTED CV SVC/PROCEDURE: CPT

## 2023-07-03 RX ADMIN — MIDAZOLAM ONE MG: 1 INJECTION INTRAMUSCULAR; INTRAVENOUS at 11:25

## 2023-07-03 RX ADMIN — MIDAZOLAM ONE MG: 1 INJECTION INTRAMUSCULAR; INTRAVENOUS at 11:32

## 2023-07-03 NOTE — P.CARDCATH
Description of Procedure: 


PROCEDURES PERFORMED: Left heart catheterization, bilateral coronary 

angiography, ultrasound guided arterial access, iFR LAD





INDICATION: Abnormal stress test, dyspnea on exertion concerning for anginal 

equivalent





CONSENT:I have discussed the risks, benefits and alternative therapies for the 

above-mentioned procedure and for both sedation/analgesia as well as necessary 

blood product administration, if indicated, as they pertain to this patient.  

The patient has indicated understanding and acceptance of the risks and 

procedures discussed.





PROCEDURE: After the risks, benefits and alternatives of the above mentioned 

procedure explained in detail with the patient, informed consent was obtained.  

Patient was taken to the catheterization lab and prepped and draped in usual 

fashion.  Ultrasound guidance was used to assess for arterial access. 1% 

lidocaine was used to anesthetize the right radial artery.  A 6-Hungarian sheath 

was placed in the right radial artery using modified Seldinger technique and 

ultrasound guidance.  Left coronary angiography was performed with a 5-Hungarian JL

3.5 catheter and right coronary angiography was performed with a 5-Hungarian JR5 

catheter in various views.  A 5-Hungarian FR5 catheter was inserted into the left 

ventricle and pressure measurements were obtained. 





The decision was made to perform iFR of the LAD.  A 0.014 pressure wire was 

advanced through the diagnostic JL 3.5 catheter and normalize at the left main. 

It was then advanced 1 cm distal to LAD lesion.  iFR was performed and was abno

rmal at 0.85.  Given more complex area of stenting recommended attempts at 

optimizing medical therapy before any consideration of intervention and 

therefore the procedure was completed.





The right radial sheath was removed and a TR band was placed with hemostasis 

achieved.  The patient tolerated the procedure well.  Patient was transported 

back to the post catheterization holding area in stable condition.





Conscious Sedation: Patient was monitored under the direct supervision of myself

for conscious sedation using Versed and fentanyl for a total duration of 20 

minutes   


HEMODYNAMICS: 


Aorta: 144/78


OV: 139/5, LVEDP 18





SELECTIVE CORONARY ARTERIOGRAPHY: 


LEFT MAIN: The left main is a large caliber vessel which bifurcates into the LAD

and circumflex.  There is no significant stenosis.


LEFT ANTERIOR DESCENDING CORONARY ARTERY: LAD is a large caliber vessel which 

wraps around to the apex.  There is a "high" diagonal 1 branch which is nearly 

ramus.  The LAD is normal other than a mid LAD 50% stenosis at the level of a 

small to moderate caliber diagonal 3 branch.  Otherwise LAD is normal.


LEFT CIRCUMFLEX CORONARY ARTERY: Left circumflex is a moderate caliber vessel 

without significant stenosis.


RIGHT CORONARY ARTERY: The right coronary artery is a large caliber vessel which

gives off a PDA and PLV branch and is the dominant vessel.  There is no 

significant stenosis.








FINAL IMPRESSION: 


1.  Relatively normal coronary arteries other than a mid LAD 50% stenosis


2.  Abnormal iFR LAD


3.  Mildly elevated left sided filling pressures





PLAN: 


1.  Aggressive risk factor modification per most recent ACC/AHA guidelines.


2.  iFR of LAD is abnormal in same distribution as abnormal stress echo.  Given 

more complex with bifurcation and appearing more in the 50% range 

angiographically, would recommend medical therapy and increasing antianginals.  

If patient having persistent angina may consider PCI.

## 2024-05-26 ENCOUNTER — HOSPITAL ENCOUNTER (EMERGENCY)
Dept: HOSPITAL 47 - EC | Age: 64
Discharge: HOME | End: 2024-05-26
Payer: MEDICAID

## 2024-05-26 VITALS — SYSTOLIC BLOOD PRESSURE: 154 MMHG | DIASTOLIC BLOOD PRESSURE: 79 MMHG

## 2024-05-26 VITALS — HEART RATE: 63 BPM | TEMPERATURE: 98.1 F | RESPIRATION RATE: 18 BRPM

## 2024-05-26 DIAGNOSIS — Z88.1: ICD-10-CM

## 2024-05-26 DIAGNOSIS — Z88.2: ICD-10-CM

## 2024-05-26 DIAGNOSIS — F17.200: ICD-10-CM

## 2024-05-26 DIAGNOSIS — N20.0: Primary | ICD-10-CM

## 2024-05-26 LAB
ALBUMIN SERPL-MCNC: 4 G/DL (ref 3.5–5)
ALP SERPL-CCNC: 94 U/L (ref 38–126)
ALT SERPL-CCNC: 20 U/L (ref 4–34)
AMYLASE SERPL-CCNC: 70 U/L (ref 30–110)
ANION GAP SERPL CALC-SCNC: 5 MMOL/L
AST SERPL-CCNC: 29 U/L (ref 14–36)
BASOPHILS # BLD AUTO: 0.1 K/UL (ref 0–0.2)
BASOPHILS NFR BLD AUTO: 1 %
BUN SERPL-SCNC: 14 MG/DL (ref 7–17)
CALCIUM SPEC-MCNC: 9 MG/DL (ref 8.4–10.2)
CHLORIDE SERPL-SCNC: 106 MMOL/L (ref 98–107)
CO2 SERPL-SCNC: 24 MMOL/L (ref 22–30)
EOSINOPHIL # BLD AUTO: 0.2 K/UL (ref 0–0.7)
EOSINOPHIL NFR BLD AUTO: 3 %
ERYTHROCYTE [DISTWIDTH] IN BLOOD BY AUTOMATED COUNT: 4.62 M/UL (ref 3.8–5.4)
ERYTHROCYTE [DISTWIDTH] IN BLOOD: 12.7 % (ref 11.5–15.5)
GLUCOSE SERPL-MCNC: 93 MG/DL (ref 74–99)
HCT VFR BLD AUTO: 44.3 % (ref 34–46)
HGB BLD-MCNC: 14.3 GM/DL (ref 11.4–16)
LIPASE SERPL-CCNC: 124 U/L (ref 23–300)
LYMPHOCYTES # SPEC AUTO: 1.8 K/UL (ref 1–4.8)
LYMPHOCYTES NFR SPEC AUTO: 31 %
MCH RBC QN AUTO: 30.9 PG (ref 25–35)
MCHC RBC AUTO-ENTMCNC: 32.2 G/DL (ref 31–37)
MCV RBC AUTO: 95.8 FL (ref 80–100)
MONOCYTES # BLD AUTO: 0.4 K/UL (ref 0–1)
MONOCYTES NFR BLD AUTO: 6 %
NEUTROPHILS # BLD AUTO: 3.3 K/UL (ref 1.3–7.7)
NEUTROPHILS NFR BLD AUTO: 57 %
PH UR: 5.5 [PH] (ref 5–8)
PLATELET # BLD AUTO: 254 K/UL (ref 150–450)
POTASSIUM SERPL-SCNC: 4.7 MMOL/L (ref 3.5–5.1)
PROT SERPL-MCNC: 6.8 G/DL (ref 6.3–8.2)
RBC UR QL: <1 /HPF (ref 0–5)
SODIUM SERPL-SCNC: 135 MMOL/L (ref 137–145)
SP GR UR: 1.01 (ref 1–1.03)
SQUAMOUS UR QL AUTO: <1 /HPF (ref 0–4)
UROBILINOGEN UR QL STRIP: <2 MG/DL (ref ?–2)
WBC # BLD AUTO: 5.9 K/UL (ref 3.8–10.6)
WBC # UR AUTO: 2 /HPF (ref 0–5)

## 2024-05-26 PROCEDURE — 36415 COLL VENOUS BLD VENIPUNCTURE: CPT

## 2024-05-26 PROCEDURE — 96361 HYDRATE IV INFUSION ADD-ON: CPT

## 2024-05-26 PROCEDURE — 80053 COMPREHEN METABOLIC PANEL: CPT

## 2024-05-26 PROCEDURE — 96374 THER/PROPH/DIAG INJ IV PUSH: CPT

## 2024-05-26 PROCEDURE — 96375 TX/PRO/DX INJ NEW DRUG ADDON: CPT

## 2024-05-26 PROCEDURE — 81001 URINALYSIS AUTO W/SCOPE: CPT

## 2024-05-26 PROCEDURE — 85025 COMPLETE CBC W/AUTO DIFF WBC: CPT

## 2024-05-26 PROCEDURE — 99284 EMERGENCY DEPT VISIT MOD MDM: CPT

## 2024-05-26 PROCEDURE — 82150 ASSAY OF AMYLASE: CPT

## 2024-05-26 PROCEDURE — 83690 ASSAY OF LIPASE: CPT

## 2024-05-26 PROCEDURE — 76705 ECHO EXAM OF ABDOMEN: CPT

## 2024-05-26 PROCEDURE — 74176 CT ABD & PELVIS W/O CONTRAST: CPT

## 2024-05-26 RX ADMIN — MORPHINE SULFATE STA MG: 4 INJECTION, SOLUTION INTRAMUSCULAR; INTRAVENOUS at 13:02

## 2024-05-26 RX ADMIN — CEFAZOLIN STA MLS/HR: 330 INJECTION, POWDER, FOR SOLUTION INTRAMUSCULAR; INTRAVENOUS at 11:27

## 2024-05-26 RX ADMIN — ONDANSETRON STA MG: 2 INJECTION INTRAMUSCULAR; INTRAVENOUS at 11:43

## 2024-05-26 RX ADMIN — KETOROLAC TROMETHAMINE STA MG: 15 INJECTION, SOLUTION INTRAMUSCULAR; INTRAVENOUS at 11:43

## 2024-05-26 NOTE — CT
EXAMINATION TYPE: CT abdomen pelvis wo con

 

DATE OF EXAM: 5/26/2024

 

COMPARISON: 9/17/2022

 

INDICATION: right flank pain

 

DLP: 703.2 mGycm, Automated exposure control for dose reduction was used.

 

CONTRAST:  0 mL of Isovue 300. 

                        Study performed without Oral Contrast

 

TECHNIQUE: Axial images were obtained from above the diaphragm to the pubic rami in the axial plane a
t 5 mm thick sections.  Reconstructed images are reviewed on the computer in the coronal plane.  

 

FINDINGS:

 

Limited CT sections are obtained the lung bases.  The lung bases are clear.

 

CT ABDOMEN:

 

Liver: Normal

 

Spleen: Normal

 

Pancreas: Normal

 

Adrenal glands: The adrenal glands are normal.

 

Gallbladder: Normal  

 

Kidneys: No masses are evident. No hydronephrosis is present.   No cysts are present.  Delayed images
 were obtained through the kidneys, which remain unremarkable. Extremely subtle right ureterovesical 
junction stone estimated at 1 mm cannot be excluded. Series 201 image 118.

 

Aorta: Vascular calcification is within the aorta. 

 

Inferior vena cava: Normal.

 

CT PELVIS: 

Loops of bowel within the abdomen and pelvis are normal.     There are loops of bowel which are incom
pletely distended or lack oral contrast limiting their evaluation.

 

Appendix: Normal as visualized.

 

Urinary bladder: Normal. 

 

Genitourinary structures: Uterus is unremarkable. Adnexa are normal.

 

Osseous structures: No suspicious lytic or sclerotic lesions evident. Degenerative disc changes are w
ithin the lower lumbar spine with loss of disc height L5-S1. Vertebral body heights are preserved.

 

IMPRESSION:

1.  1 mm right ureterovesical junction stone not excluded. No significant hydronephrosis or hydrouret
er.
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
## 2024-05-26 NOTE — US
EXAMINATION TYPE: US gallbladder

 

DATE OF EXAM: 5/26/2024

 

COMPARISON: NONE

 

CLINICAL INDICATION: Female, 63 years old with history of ruq pain; RUQ pain

 

TECHNIQUE: Multiple sonographic images of the right upper quadrant are obtained.

 

FINDINGS:

 

EXAM MEASUREMENTS:

 

Liver Length:  12.2 cm   

Gallbladder Wall:  .2 cm   

CBD:  .3 cm

Right Kidney:  9.2 x 3.9 x 4.8 cm

 

SONOGRAPHER NOTES:

 

Pancreas:  Tail obscured by overlying bowel gas

Liver:  Increased attenuation  

Gallbladder:  No stones seen

**Evidence for sonographic Negro's sign:  No

CBD:  wnl 

Right Kidney:  Echogenic area seen lower pole.  No shadowing evident. No hydronephrosis present

 

 

 

IMPRESSION: 

 

1. Nonshadowing renal stone right kidney may be present.

2. No acute ultrasound abnormality otherwise apparent.